# Patient Record
Sex: MALE | Race: WHITE | NOT HISPANIC OR LATINO | Employment: FULL TIME | RURAL
[De-identification: names, ages, dates, MRNs, and addresses within clinical notes are randomized per-mention and may not be internally consistent; named-entity substitution may affect disease eponyms.]

---

## 2024-06-19 ENCOUNTER — OFFICE VISIT (OUTPATIENT)
Dept: FAMILY MEDICINE CLINIC | Facility: CLINIC | Age: 29
End: 2024-06-19
Payer: COMMERCIAL

## 2024-06-19 VITALS
OXYGEN SATURATION: 100 % | BODY MASS INDEX: 35.19 KG/M2 | WEIGHT: 283 LBS | HEIGHT: 75 IN | DIASTOLIC BLOOD PRESSURE: 80 MMHG | HEART RATE: 102 BPM | SYSTOLIC BLOOD PRESSURE: 124 MMHG

## 2024-06-19 DIAGNOSIS — F41.1 GENERALIZED ANXIETY DISORDER: ICD-10-CM

## 2024-06-19 DIAGNOSIS — Z00.00 WELL ADULT EXAM: Primary | ICD-10-CM

## 2024-06-19 DIAGNOSIS — F33.1 MODERATE EPISODE OF RECURRENT MAJOR DEPRESSIVE DISORDER: ICD-10-CM

## 2024-06-19 PROCEDURE — 99213 OFFICE O/P EST LOW 20 MIN: CPT | Performed by: STUDENT IN AN ORGANIZED HEALTH CARE EDUCATION/TRAINING PROGRAM

## 2024-06-19 PROCEDURE — 99385 PREV VISIT NEW AGE 18-39: CPT | Performed by: STUDENT IN AN ORGANIZED HEALTH CARE EDUCATION/TRAINING PROGRAM

## 2024-06-19 RX ORDER — CITALOPRAM HYDROBROMIDE 10 MG/1
10 TABLET ORAL DAILY
Qty: 30 TABLET | Refills: 1 | Status: SHIPPED | OUTPATIENT
Start: 2024-06-19

## 2024-06-19 NOTE — PROGRESS NOTES
"Chief Complaint  Establish Care    Subjective          Ntahaly Webber presents to [facility]  History of Present Illness    Patient is here to establish care.     Patient has been dealing with HTN for the past several years, and has been on amlodipine and doing well with it for the past 2 years. He has no trouble with CP, SOA, or Le edema. He states that he does get some caffeine headaches but these are manageable.     He states that he has been having more trouble with mood. He states that he has been having more her both anxiety and depression since his brother passed away in 2017.  He states that he has always had difficulty with anxiety and depression, but it has been worsening.  He would like to discuss adding a medication at this time if possible.    Last labs were earlier this year.     Grandmother with Type 2 DM. Grandfather with liver cancer. No FH of prostate or colon cancer.         Objective   Vital Signs:   /80   Pulse 102   Ht 190.5 cm (75\")   Wt 128 kg (283 lb)   SpO2 100%   BMI 35.37 kg/m²     Body mass index is 35.37 kg/m².    Review of Systems    Past History:  Medical History: has a past medical history of Hypertension.   Surgical History: has a past surgical history that includes Knee surgery; Tonsillectomy; and Cholecystectomy.   Family History: family history is not on file.   Social History: reports that he has never smoked. He has never used smokeless tobacco. He reports that he does not currently use alcohol. He reports that he does not use drugs.      Current Outpatient Medications:     citalopram (CeleXA) 10 MG tablet, Take 1 tablet by mouth Daily., Disp: 30 tablet, Rfl: 1    Allergies: Valsartan    Physical Exam  Constitutional:       General: He is not in acute distress.     Appearance: He is not ill-appearing or toxic-appearing.   HENT:      Head: Normocephalic and atraumatic.   Cardiovascular:      Rate and Rhythm: Normal rate and regular " rhythm.      Heart sounds: No murmur heard.  Pulmonary:      Effort: Pulmonary effort is normal. No respiratory distress.   Neurological:      General: No focal deficit present.      Mental Status: He is alert and oriented to person, place, and time.   Psychiatric:         Mood and Affect: Mood normal.         Thought Content: Thought content normal.          Result Review :                   Assessment and Plan    Diagnoses and all orders for this visit:    1. Well adult exam (Primary)  -     Comprehensive Metabolic Panel; Future  -     CBC & Differential; Future  -     Lipid Panel; Future  -     TSH; Future  -     T4, Free; Future    2. Generalized anxiety disorder    3. Moderate episode of recurrent major depressive disorder    Other orders  -     citalopram (CeleXA) 10 MG tablet; Take 1 tablet by mouth Daily.  Dispense: 30 tablet; Refill: 1    Blood work ordered for prior to next visit.  Will discuss with patient at his follow-up.    Discussed with patient mood, and will start Celexa 10 mg daily.  Discussed side effect profile and advised if he has any worsening of his mood he should discontinue medication.  Follow-up in 4 to 6 weeks or sooner with any new or worsening symptoms.    Past medical and surgical history as well as allergies, family history and social history were reviewed, and discussed with patient.  Chronic conditions were reviewed as well as medications.   Anticipatory guidance handouts including healthy diet, health maintenance, as well as regular exercise and general instructions were given via [employer], and patient was able to ask questions and discuss any concerns.        Follow Up   No follow-ups on file.  Patient was given instructions and counseling regarding his condition or for health maintenance advice. Please see specific information pulled into the AVS if appropriate.     Ave Marrero, DO

## 2024-08-07 ENCOUNTER — TELEPHONE (OUTPATIENT)
Dept: FAMILY MEDICINE CLINIC | Facility: CLINIC | Age: 29
End: 2024-08-07

## 2024-08-07 NOTE — TELEPHONE ENCOUNTER
Hub relay: left message for patient to call and rescheduled their last two missed appointments please transfer to Kristy

## 2025-01-21 ENCOUNTER — TELEPHONE (OUTPATIENT)
Dept: FAMILY MEDICINE CLINIC | Facility: CLINIC | Age: 30
End: 2025-01-21
Payer: COMMERCIAL

## 2025-01-21 NOTE — TELEPHONE ENCOUNTER
Hub relay: left message for patient to return call and schedule lab work please tranfer to Kristy

## 2025-03-26 ENCOUNTER — OFFICE VISIT (OUTPATIENT)
Dept: FAMILY MEDICINE CLINIC | Facility: CLINIC | Age: 30
End: 2025-03-26
Payer: COMMERCIAL

## 2025-03-26 VITALS
HEIGHT: 75 IN | WEIGHT: 270 LBS | OXYGEN SATURATION: 100 % | DIASTOLIC BLOOD PRESSURE: 102 MMHG | HEART RATE: 116 BPM | TEMPERATURE: 98.1 F | SYSTOLIC BLOOD PRESSURE: 164 MMHG | BODY MASS INDEX: 33.57 KG/M2

## 2025-03-26 DIAGNOSIS — R11.2 NAUSEA AND VOMITING IN ADULT: Primary | ICD-10-CM

## 2025-03-26 DIAGNOSIS — R03.0 ELEVATED BLOOD PRESSURE READING: ICD-10-CM

## 2025-03-26 PROCEDURE — 99213 OFFICE O/P EST LOW 20 MIN: CPT | Performed by: NURSE PRACTITIONER

## 2025-03-26 RX ORDER — PROMETHAZINE HYDROCHLORIDE 25 MG/1
25 TABLET ORAL EVERY 6 HOURS PRN
Qty: 20 TABLET | Refills: 0 | Status: SHIPPED | OUTPATIENT
Start: 2025-03-26

## 2025-03-26 NOTE — PROGRESS NOTES
"Chief Complaint  Vomiting (Pt complains of vomiting, diarrhea, nausea and chills onset last night. He needs a note for work. )    Subjective          Nathaly Webber presents to [facility]  History of Present Illness  Pt has had N/V/D since last night. He denies severe abdominal pain. He denies known sick contacts. He states he took meds for HTN in the past but his BP improved with weight loss.   Vomiting   Associated symptoms include diarrhea. Pertinent negatives include no abdominal pain, chest pain or fever.       History of Present Illness      Objective   Vital Signs:   BP (!) 164/102   Pulse 116   Temp 98.1 °F (36.7 °C) (Oral)   Ht 190.5 cm (75\")   Wt 122 kg (270 lb)   SpO2 100%   BMI 33.75 kg/m²     Body mass index is 33.75 kg/m².    Review of Systems   Constitutional:  Negative for fatigue and fever.   Respiratory:  Negative for shortness of breath.    Cardiovascular:  Negative for chest pain, palpitations and leg swelling.   Gastrointestinal:  Positive for diarrhea, nausea and vomiting. Negative for abdominal pain.   Neurological:  Negative for syncope.   Psychiatric/Behavioral:  The patient is not nervous/anxious.           Current Outpatient Medications:     promethazine (PHENERGAN) 25 MG tablet, Take 1 tablet by mouth Every 6 (Six) Hours As Needed for Nausea or Vomiting., Disp: 20 tablet, Rfl: 0      Allergies: Valsartan    Physical Exam  Constitutional:       Appearance: Normal appearance.   HENT:      Head: Normocephalic.   Eyes:      Conjunctiva/sclera: Conjunctivae normal.      Pupils: Pupils are equal, round, and reactive to light.   Cardiovascular:      Rate and Rhythm: Normal rate and regular rhythm.      Heart sounds: Normal heart sounds.   Pulmonary:      Effort: Pulmonary effort is normal.      Breath sounds: Normal breath sounds.   Abdominal:      Tenderness: There is no abdominal tenderness.   Musculoskeletal:         General: Normal range of motion. "   Skin:     General: Skin is warm and dry.      Capillary Refill: Capillary refill takes less than 2 seconds.   Neurological:      General: No focal deficit present.      Mental Status: He is alert and oriented to person, place, and time.   Psychiatric:         Mood and Affect: Mood normal.         Behavior: Behavior normal.         Thought Content: Thought content normal.         Judgment: Judgment normal.          Physical Exam         Result Review :                Results            Assessment and Plan    Diagnoses and all orders for this visit:    1. Nausea and vomiting in adult (Primary)  Comments:  Phenergan PRN. Sips then increase fluids. Butts diet. Go to the ER for worsened sx and RTC if not improving in 48 hours.  Orders:  -     promethazine (PHENERGAN) 25 MG tablet; Take 1 tablet by mouth Every 6 (Six) Hours As Needed for Nausea or Vomiting.  Dispense: 20 tablet; Refill: 0    2. Elevated blood pressure reading  Comments:  Monitor BP and keep log. F/U if not improving in 1 week.                  Follow Up   Return in about 2 days (around 3/28/2025) for if not improving or sooner if symptoms worsen.  Patient was given instructions and counseling regarding his condition or for health maintenance advice. Please see specific information pulled into the AVS if appropriate.     NELLIE Bazzi

## 2025-03-26 NOTE — LETTER
March 26, 2025    Nathaly Webber  [address]  [place] [address]              Please excuse from work for 3 days. Thank you.                   Jaye Walker, APRN

## 2025-06-17 ENCOUNTER — TELEPHONE (OUTPATIENT)
Dept: FAMILY MEDICINE CLINIC | Facility: CLINIC | Age: 30
End: 2025-06-17
Payer: COMMERCIAL

## 2025-06-17 NOTE — TELEPHONE ENCOUNTER
Cancelling orders in chart. Patient will need a physical if he wants to have labs done. It has been a year